# Patient Record
Sex: MALE | Race: BLACK OR AFRICAN AMERICAN | Employment: UNEMPLOYED | URBAN - METROPOLITAN AREA
[De-identification: names, ages, dates, MRNs, and addresses within clinical notes are randomized per-mention and may not be internally consistent; named-entity substitution may affect disease eponyms.]

---

## 2022-02-24 ENCOUNTER — HOSPITAL ENCOUNTER (OUTPATIENT)
Age: 27
Setting detail: SPECIMEN
Discharge: HOME | End: 2022-02-24

## 2022-02-24 ENCOUNTER — OFFICE VISIT (OUTPATIENT)
Dept: INTERNAL MEDICINE | Age: 27
End: 2022-02-24
Payer: MEDICARE

## 2022-02-24 VITALS
BODY MASS INDEX: 24.22 KG/M2 | WEIGHT: 173 LBS | HEART RATE: 83 BPM | HEIGHT: 71 IN | DIASTOLIC BLOOD PRESSURE: 76 MMHG | SYSTOLIC BLOOD PRESSURE: 126 MMHG | OXYGEN SATURATION: 98 %

## 2022-02-24 DIAGNOSIS — G25.0 BENIGN ESSENTIAL TREMOR: ICD-10-CM

## 2022-02-24 DIAGNOSIS — Z76.89 ESTABLISHING CARE WITH NEW DOCTOR, ENCOUNTER FOR: Primary | ICD-10-CM

## 2022-02-24 DIAGNOSIS — Z11.59 ENCOUNTER FOR HEPATITIS C SCREENING TEST FOR LOW RISK PATIENT: ICD-10-CM

## 2022-02-24 PROCEDURE — 99211 OFF/OP EST MAY X REQ PHY/QHP: CPT | Performed by: INTERNAL MEDICINE

## 2022-02-24 PROCEDURE — G8484 FLU IMMUNIZE NO ADMIN: HCPCS

## 2022-02-24 PROCEDURE — G8427 DOCREV CUR MEDS BY ELIG CLIN: HCPCS

## 2022-02-24 PROCEDURE — 4004F PT TOBACCO SCREEN RCVD TLK: CPT

## 2022-02-24 PROCEDURE — G8420 CALC BMI NORM PARAMETERS: HCPCS

## 2022-02-24 PROCEDURE — 99203 OFFICE O/P NEW LOW 30 MIN: CPT

## 2022-02-24 SDOH — ECONOMIC STABILITY: FOOD INSECURITY: WITHIN THE PAST 12 MONTHS, THE FOOD YOU BOUGHT JUST DIDN'T LAST AND YOU DIDN'T HAVE MONEY TO GET MORE.: NEVER TRUE

## 2022-02-24 SDOH — ECONOMIC STABILITY: FOOD INSECURITY: WITHIN THE PAST 12 MONTHS, YOU WORRIED THAT YOUR FOOD WOULD RUN OUT BEFORE YOU GOT MONEY TO BUY MORE.: NEVER TRUE

## 2022-02-24 ASSESSMENT — PATIENT HEALTH QUESTIONNAIRE - PHQ9
SUM OF ALL RESPONSES TO PHQ QUESTIONS 1-9: 0
SUM OF ALL RESPONSES TO PHQ QUESTIONS 1-9: 0
1. LITTLE INTEREST OR PLEASURE IN DOING THINGS: 0
2. FEELING DOWN, DEPRESSED OR HOPELESS: 0
SUM OF ALL RESPONSES TO PHQ9 QUESTIONS 1 & 2: 0
SUM OF ALL RESPONSES TO PHQ QUESTIONS 1-9: 0
SUM OF ALL RESPONSES TO PHQ QUESTIONS 1-9: 0

## 2022-02-24 ASSESSMENT — SOCIAL DETERMINANTS OF HEALTH (SDOH): HOW HARD IS IT FOR YOU TO PAY FOR THE VERY BASICS LIKE FOOD, HOUSING, MEDICAL CARE, AND HEATING?: NOT HARD AT ALL

## 2022-02-24 NOTE — PATIENT INSTRUCTIONS
Follow-up appointment scheduled for pt call the office in a year or when needed, AVS given to patient. Labs given to patient, they will have them done before their next visit.        amanda

## 2022-02-24 NOTE — PROGRESS NOTES
Internal Medicine Clinic New Patient Note    Date of patient's visit: 2/24/2022  Name:  Renetta Sung  Primary Care Physician: Lucy Dawson MD    Reason for visit: First Visit, establish care     HISTORY OF PRESENTING ILLNESS:    History was obtained from the patient. Renetta Sung is a 31 y.o. is here to establish care. Patient came with his grandmother. Patient came for the concern of requiring disability. Patient stated he is able to walk, sit, lift heavy objects, converse, hear without any difficulty, or chest pain, shortness of breath, palpitations. Patient also stated he has no vision problems hearing issues or speaking issues. Patient stated he does not feel depressed or low in mood. Patient stated he has bilateral tremors, patient has fine tremors on examination, not intentional. His upper extremity strength is 5 out of 5 bilaterally, power in bilateral hands is adequate. He complained of increased frequency of urination however denied any burning urination or nocturia. Patient was educated on avoiding recreational drugs, decreasing alcohol intake. On back examination patient had full range of motion, nontender    PMH - psychotic disorder 2015, anxiety    Social history - drinks alcohol socially, smoked marijuana in the past, denies smoking  Surgical history - none    PAST MEDICAL HISTORY:    No past medical history on file. PAST SURGICAL HISTORY:    No past surgical history on file.     ALLERGIES:    No Known Allergies      HOME MEDICATION:      Current Outpatient Medications on File Prior to Visit   Medication Sig Dispense Refill    FLUoxetine (PROZAC) 10 MG capsule Take 1 capsule by mouth daily (Patient not taking: Reported on 2/24/2022) 30 capsule 3    risperiDONE (RISPERDAL) 1 MG tablet Take 1 tablet by mouth 2 times daily (Patient not taking: Reported on 2/24/2022) 28 tablet 0    ibuprofen (ADVIL;MOTRIN) 800 MG tablet Take 800 mg by mouth every 6 hours as needed for Pain (Patient not taking: Reported on 2/24/2022)       No current facility-administered medications on file prior to visit. SOCIAL HISTORY:    TOBACCO:   reports that he has never smoked. He has never used smokeless tobacco.  ETOH:   reports no history of alcohol use. DRUGS:  reports no history of drug use. OCCUPATION:      FAMILY HISTORY:          Problem Relation Age of Onset    High Blood Pressure Maternal Grandfather        REVIEW OF SYSTEMS:    ENT: negative  Respiratory: no cough, shortness of breath, or wheezing  Cardiovascular: no chest pain or dyspnea on exertion  Gastrointestinal: no abdominal pain, black or bloody stools  Neurological: no TIA or stroke symptoms  Endocrine: negative  Genito-Urinary: no dysuria, trouble voiding, or hematuria  Allergy and Immunology: negative  Hematological and Lymphatic: negative  Musculoskeletal: negative  Dermatological: negative    PHYSICAL EXAM:      Vitals:    02/24/22 0846   BP: 126/76   Pulse: 83   SpO2: 98%      Physician Exam:  General appearance - alert, well appearing, and in no distress  Mental status - alert, oriented to person, place, and time  Chest - clear to auscultation, no wheezes, rales or rhonchi, symmetric air entry  Heart - normal rate, regular rhythm, normal S1, S2, no murmurs, rubs, clicks or gallops  Abdomen - soft, nontender, nondistended, no masses or organomegaly  Back exam - full range of motion, no tenderness, palpable spasm or pain on motion. No scoliosis noted.  No signs of radiculopathy or neuropathy , no sensory or motor impairment     Neurological - alert, oriented, normal speech, no focal findings or movement disorder noted  Musculoskeletal - no joint tenderness, deformity or swelling  Skin - normal coloration and turgor, no rashes, no suspicious skin lesions noted    LABORATORY FINDINGS:    CBC:   Lab Results   Component Value Date    WBC 9.0 10/29/2015    HGB 14.4 10/29/2015     10/29/2015     BMP:    Lab Results   Component Value Date  10/29/2015    K 4.0 10/29/2015     10/29/2015    CO2 26 10/29/2015    BUN 15 10/29/2015    CREATININE 1.08 10/29/2015    GLUCOSE 103 10/29/2015     Hemoglobin A1C: No results found for: LABA1C  Lipid profile: No results found for: CHOL, TRIG, HDL, LDL  Thyroid functions: No results found for: TSH   Hepatic functions:   Lab Results   Component Value Date    ALT 10 10/29/2015    AST 15 10/29/2015    PROT 8.0 10/29/2015    BILITOT 0.38 10/29/2015    LABALBU 4.9 10/29/2015       Any additional findings:    Assessment and Plan:   1. Establishing care with new doctor, encounter for    - CBC with Auto Differential; Future  - Basic Metabolic Panel; Future  - Hepatic Function Panel; Future  - Hepatitis C Antibody; Future  - [place] DEPRESSION SCREEN ANNUAL    2. Benign essential tremor      3. Encounter for hepatitis C screening test for low risk patient             Follow-up:     1. Healthcare maintenance -patient denied vaccination. Patient agreed to blood work. Will order CBC, BMP, LFTs, hepatitis C screening. 2. Depression screening was done  3. At this time patient does not qualify for disability. Wu received counseling on the following healthy behaviors: nutrition, exercise, tobacco cessation and decrease in alcohol consumption    Was a self-tracking handout given in paper form or via [employer]? No  If yes, see orders or list here. Discussed use, benefit, and side effects of prescribed medications. Barriers to medication compliance addressed. All patient questions answered. Pt voiced understanding. Zan Mendoza  INTERNAL MEDICINE RESIDENT, PGY-1  [id number] [facility], [address] [place] [address]  2/24/2022,12:04 PM      Attending Physician Statement  I have discussed the care of Ko Marinelli,  including pertinent history and exam findings,  with the resident. I have seen and examined the patient and the key elements of all parts of the encounter have been performed by me.   I agree with the assessment, plan and orders as documented by the resident. (GC Modifier)     Diagnosis Orders   1. Establishing care with new doctor, encounter for  CBC with Auto Differential    Basic Metabolic Panel    Hepatic Function Panel    Hepatitis C Antibody    [place] DEPRESSION SCREEN ANNUAL   2. Benign essential tremor     3. Encounter for hepatitis C screening test for low risk patient           Patient is here with his grandmother to establish care and disability paper work. He lives alone and is independent in taking care of activities of daily living. He is dependant on his grandmother for financial help and food stamps. He also has a form from job and family services   On a thorough physical and mental exam: no physical or learning disabilities were noted. He was advised to engage in more physical activity, exercises. Discouraged use of marijuana, alcohol and practice safe sex    The grandmother was counseled and reassured that his does not have any psychiatric disability at this time. Will fill out the forms for jobs and family services but not for disability.     MD MAURI Bosch  Attending Physician, [address] [place] [address], Internal Medicine Residency Program  [address] [place] [address]  2/24/2022, 12:04 PM

## 2023-05-12 ENCOUNTER — OFFICE VISIT (OUTPATIENT)
Dept: FAMILY MEDICINE CLINIC | Age: 28
End: 2023-05-12
Payer: MEDICARE

## 2023-05-12 VITALS
HEART RATE: 60 BPM | BODY MASS INDEX: 22.46 KG/M2 | DIASTOLIC BLOOD PRESSURE: 84 MMHG | HEIGHT: 71 IN | WEIGHT: 160.4 LBS | SYSTOLIC BLOOD PRESSURE: 130 MMHG

## 2023-05-12 DIAGNOSIS — F29 PSYCHOSIS, UNSPECIFIED PSYCHOSIS TYPE (HCC): Primary | ICD-10-CM

## 2023-05-12 DIAGNOSIS — Z76.89 ESTABLISHING CARE WITH NEW DOCTOR, ENCOUNTER FOR: ICD-10-CM

## 2023-05-12 PROCEDURE — G8420 CALC BMI NORM PARAMETERS: HCPCS

## 2023-05-12 PROCEDURE — G8427 DOCREV CUR MEDS BY ELIG CLIN: HCPCS

## 2023-05-12 PROCEDURE — 1036F TOBACCO NON-USER: CPT

## 2023-05-12 PROCEDURE — 99213 OFFICE O/P EST LOW 20 MIN: CPT

## 2023-05-12 ASSESSMENT — PATIENT HEALTH QUESTIONNAIRE - PHQ9
SUM OF ALL RESPONSES TO PHQ9 QUESTIONS 1 & 2: 0
2. FEELING DOWN, DEPRESSED OR HOPELESS: 0
SUM OF ALL RESPONSES TO PHQ QUESTIONS 1-9: 0
1. LITTLE INTEREST OR PLEASURE IN DOING THINGS: 0
SUM OF ALL RESPONSES TO PHQ QUESTIONS 1-9: 0
SUM OF ALL RESPONSES TO PHQ QUESTIONS 1-9: 0
DEPRESSION UNABLE TO ASSESS: PT REFUSES
SUM OF ALL RESPONSES TO PHQ QUESTIONS 1-9: 0

## 2023-05-12 ASSESSMENT — ENCOUNTER SYMPTOMS
CONSTIPATION: 0
DIARRHEA: 0
VOMITING: 0
ABDOMINAL PAIN: 0
SORE THROAT: 0
SHORTNESS OF BREATH: 0
RHINORRHEA: 0
NAUSEA: 0

## 2023-05-15 NOTE — PROGRESS NOTES
Attending Physician Statement  I  have discussed the care of Gunnar Hawk including pertinent history and exam findings with the resident. I agree with the assessment, plan and orders as documented by the resident. /84 (Site: Right Upper Arm, Position: Sitting, Cuff Size: Medium Adult)   Pulse 60   Ht 5' 10.98\" (1.803 m)   Wt 160 lb 6.4 oz (72.8 kg)   BMI 22.38 kg/m²    BP Readings from Last 3 Encounters:   05/12/23 130/84   02/24/22 126/76   11/04/16 127/82     Wt Readings from Last 3 Encounters:   05/12/23 160 lb 6.4 oz (72.8 kg)   02/24/22 173 lb (78.5 kg)   11/04/16 177 lb (80.3 kg)          Diagnosis Orders   1. Psychosis, unspecified psychosis type ([facility] [address]  CBC with Auto Differential    Basic Metabolic Panel    TSH With Reflex Ft4      2.  Establishing care with new doctor, encounter for  CBC with Auto Differential    Basic Metabolic Panel    TSH With Reflex [address] 5/15/2023 9:33 AM

## 2025-02-14 ENCOUNTER — HOSPITAL ENCOUNTER (OUTPATIENT)
Age: 30
Setting detail: SPECIMEN
Discharge: HOME | End: 2025-02-14

## 2025-02-14 ENCOUNTER — OFFICE VISIT (OUTPATIENT)
Dept: FAMILY MEDICINE CLINIC | Age: 30
End: 2025-02-14

## 2025-02-14 VITALS
DIASTOLIC BLOOD PRESSURE: 65 MMHG | HEIGHT: 71 IN | SYSTOLIC BLOOD PRESSURE: 104 MMHG | WEIGHT: 155 LBS | BODY MASS INDEX: 21.7 KG/M2 | HEART RATE: 75 BPM

## 2025-02-14 DIAGNOSIS — Z11.59 NEED FOR HEPATITIS C SCREENING TEST: ICD-10-CM

## 2025-02-14 DIAGNOSIS — B35.0 TINEA CAPITIS: Primary | ICD-10-CM

## 2025-02-14 DIAGNOSIS — Z00.00 ENCOUNTER FOR WELL ADULT EXAM WITHOUT ABNORMAL FINDINGS: ICD-10-CM

## 2025-02-14 LAB — HCV AB SERPL QL IA: NONREACTIVE

## 2025-02-14 RX ORDER — KETOCONAZOLE 20 MG/ML
SHAMPOO, SUSPENSION TOPICAL
Qty: 120 ML | Refills: 1 | Status: SHIPPED | OUTPATIENT
Start: 2025-02-14

## 2025-02-14 RX ORDER — KETOCONAZOLE 20 MG/ML
SHAMPOO, SUSPENSION TOPICAL
Qty: 120 ML | Refills: 0 | Status: CANCELLED | OUTPATIENT
Start: 2025-02-14

## 2025-02-14 RX ORDER — TERBINAFINE HYDROCHLORIDE 250 MG/1
250 TABLET ORAL DAILY
Qty: 31 TABLET | Refills: 0 | Status: SHIPPED | OUTPATIENT
Start: 2025-02-14 | End: 2025-03-17

## 2025-02-14 SDOH — ECONOMIC STABILITY: FOOD INSECURITY: WITHIN THE PAST 12 MONTHS, YOU WORRIED THAT YOUR FOOD WOULD RUN OUT BEFORE YOU GOT MONEY TO BUY MORE.: NEVER TRUE

## 2025-02-14 SDOH — ECONOMIC STABILITY: FOOD INSECURITY: WITHIN THE PAST 12 MONTHS, THE FOOD YOU BOUGHT JUST DIDN'T LAST AND YOU DIDN'T HAVE MONEY TO GET MORE.: NEVER TRUE

## 2025-02-14 ASSESSMENT — PATIENT HEALTH QUESTIONNAIRE - PHQ9
SUM OF ALL RESPONSES TO PHQ9 QUESTIONS 1 & 2: 0
SUM OF ALL RESPONSES TO PHQ QUESTIONS 1-9: 0
2. FEELING DOWN, DEPRESSED OR HOPELESS: NOT AT ALL
SUM OF ALL RESPONSES TO PHQ QUESTIONS 1-9: 0
1. LITTLE INTEREST OR PLEASURE IN DOING THINGS: NOT AT ALL

## 2025-02-14 NOTE — PROGRESS NOTES
Visit Information    Have you changed or started any medications since your last visit including any over-the-counter medicines, vitamins, or herbal medicines? no   Are you having any side effects from any of your medications? -  no  Have you stopped taking any of your medications? Is so, why? -  no    Have you seen any other physician or provider since your last visit? No  Have you had any other diagnostic tests since your last visit? No  Have you been seen in the emergency room and/or had an admission to a hospital since we last saw you? Yes - Records Obtained [facility]   Have you had your routine dental cleaning in the past 6 months? no    Have you activated your [employer] account? If not, what are your barriers? Yes     Patient Care Team:  Yoli Turner MD as PCP - General (Family Medicine)    Medical History Review  Past Medical, Family, and Social History reviewed and does not contribute to the patient presenting condition    Health Maintenance   Topic Date Due    Hepatitis B vaccine (4 of 4 - 4-dose series) 11/08/1995    Varicella vaccine (1 of 2 - 13+ 2-dose series) Never done    Hepatitis C screen  Never done    DTaP/Tdap/Td vaccine (7 - Td or Tdap) 09/12/2018    Depression Screen  05/12/2024    Flu vaccine (1) Never done    COVID-19 Vaccine (1 - 2024-25 season) Never done    Hib vaccine  Completed    HPV vaccine  Completed    Polio vaccine  Completed    Meningococcal (ACWY) vaccine  Completed    HIV screen  Completed    Hepatitis A vaccine  Aged Out    Pneumococcal 0-49 years Vaccine  Aged Out

## 2025-02-14 NOTE — PROGRESS NOTES
[facility] Residency Program - Outpatient Note      Subjective:    Wu Crowe is a 29 y.o. male with  has no past medical history on file.    Presented to the office today for:  Chief Complaint   Patient presents with    Annual Exam    Other     Patient refused vaccine         HPI  Patient is a 29-year-old male with past medical history of psychotic disorder seen today for annual physical exam.  Patient accompanied by grandmother.  Has complaints of persistent rash on his scalp since last year.  - Patient was seen at urgent care on 3/18/2025 and diagnosed with tinea capitis and treated with PO fluconazole 150 mg  once weekly for 6 weeks, ketoconazole shampoo and   Ketoconazole cream.       Seeing therapist at [facility] for the past 6 months   - Going to be started on Abilify  - Following up monthly       Review of Systems   Constitutional:  Negative for fatigue.   Eyes:  Negative for visual disturbance.   Respiratory:  Negative for shortness of breath.    Cardiovascular:  Negative for chest pain and palpitations.   Gastrointestinal:  Negative for abdominal pain, constipation, diarrhea, nausea and vomiting.   Endocrine: Negative.    Genitourinary:  Negative for dysuria.   Musculoskeletal: Negative.    Skin:  Positive for rash.   Neurological:  Negative for headaches.   Psychiatric/Behavioral: Negative.                   The patient has a   Family History   Problem Relation Age of Onset    High Blood Pressure Maternal Grandfather        Objective:    /65 (Site: Right Upper Arm, Position: Sitting, Cuff Size: Medium Adult)   Pulse 75   Ht 1.803 m (5' 10.98\")   Wt 70.3 kg (155 lb)   BMI 21.63 kg/m²    BP Readings from Last 3 Encounters:   02/14/25 104/65   05/12/23 130/84   02/24/22 126/76       Physical Exam  Constitutional:       General: He is not in acute distress.  HENT:      Head:      Comments: Thick, white, scaly circular lesions present throughout scalp and beard.

## 2025-02-14 NOTE — PATIENT INSTRUCTIONS
Thank you for letting us take care of you today. We hope all your questions were addressed. If a question was overlooked or something else comes to mind after you return home, please contact a member of your Care Team listed below.      Your Care Team at [facility] is Team #2  Kristel Arellano M.D. (Faculty)  Ne Gordon M.D. (Resident)  Yoli Turner M.D. (Resident)  Cody Bryan M.D. (Resident)  Liat Fletcher M.D. (Resident)  Karuna Aragon M.D. (Resident)  Aime Baugh, [facility]  Harper Lopez, [facility]  Padmini Alejandra,  [facility]  Jessica Hernandez, [facility]  Crystal Mckeon, [facility]  Shima Glover, [facility]  Dereje Mehta (LJ) Asya ANNABELLE (Clinical Practice Manager)  Digna Maldonado [facility] (Clinical Pharmacist)     Office phone number: [phone]    If you need to get in right away due to illness, please be advised we have \"Same Day\" appointments available Monday-Friday. Please call us at [phone] option #3 to schedule your \"Same Day\" appointment. [facility] Food Resources*  (Call [facility] for more resources)    [facility] [employer]  What they offer: Food pantry second and fourth Tuesday 4:30-6pm  Phone Number and Address: 614.219.5711 Toll Free: [employer] at [employer], between [employer] and [employer] [facility]; [address] Office on Aging of [facility]  What they offer: “Meals & Nutrition” search option on website  Phone Number and Website: 466.334.3028; https://[employer]/  [employer] [place] [facility] Caf[employer]  What they offer: Dinner is open to all (must be registered and in good standing) and three hot meals a day for shelter residents. Breakfast 7-8am, Lunch 12-1pm, and Dinner 5-6pm daily.  Phone and Address: [phone]; [address] [facility] [address]  [employer] Delivery program  What they offer: Food Box Delivery for those within [facility] who are homebound or without transportation. Applications

## 2025-03-14 ENCOUNTER — OFFICE VISIT (OUTPATIENT)
Dept: FAMILY MEDICINE CLINIC | Age: 30
End: 2025-03-14
Payer: MEDICAID

## 2025-03-14 VITALS
DIASTOLIC BLOOD PRESSURE: 78 MMHG | HEIGHT: 71 IN | WEIGHT: 160.6 LBS | SYSTOLIC BLOOD PRESSURE: 128 MMHG | BODY MASS INDEX: 22.48 KG/M2

## 2025-03-14 DIAGNOSIS — B35.0 TINEA CAPITIS: Primary | ICD-10-CM

## 2025-03-14 PROCEDURE — 99213 OFFICE O/P EST LOW 20 MIN: CPT

## 2025-03-14 ASSESSMENT — ENCOUNTER SYMPTOMS
CONSTIPATION: 0
SHORTNESS OF BREATH: 0
VOMITING: 0
NAUSEA: 0
DIARRHEA: 0
ABDOMINAL PAIN: 0

## 2025-03-14 NOTE — PATIENT INSTRUCTIONS
Thank you for letting us take care of you today. We hope all your questions were addressed. If a question was overlooked or something else comes to mind after you return home, please contact a member of your Care Team listed below.      Your Care Team at [facility] is Team #2  Kristel Arellano M.D. (Faculty)  Ne Gordon M.D. (Resident)  Yoli Turner M.D. (Resident)  Cody Bryan M.D. (Resident)  Liat Fletcher M.D. (Resident)  Karuna Aragon M.D. (Resident)  Aime Baugh, [facility]  Harper Lopez, [facility]  Padmini Alejandra,  [facility]  Jessica Hernandez, [facility]  Crystal Mckeon, [facility]  Shima Glover, [facility]  Dereje Mehta (LJ) Asya ANNABELLE (Clinical Practice Manager)  Digna Maldonado [facility] (Clinical Pharmacist)     Office phone number: [phone]    If you need to get in right away due to illness, please be advised we have \"Same Day\" appointments available Monday-Friday. Please call us at [phone] option #3 to schedule your \"Same Day\" appointment.

## 2025-03-14 NOTE — PROGRESS NOTES
Attending Physician Statement  I  have discussed the care of Wu Crowe including pertinent history and exam findings with the resident. I agree with the assessment, plan and orders as documented by the resident.      /78 (BP Site: Right Upper Arm, Patient Position: Sitting, BP Cuff Size: Medium Adult)   Ht 1.803 m (5' 11\")   Wt 72.8 kg (160 lb 9.6 oz)   BMI 22.40 kg/m²    BP Readings from Last 3 Encounters:   03/14/25 128/78   02/14/25 104/65   05/12/23 130/84     Wt Readings from Last 3 Encounters:   03/14/25 72.8 kg (160 lb 9.6 oz)   02/14/25 70.3 kg (155 lb)   05/12/23 72.8 kg (160 lb 6.4 oz)          Diagnosis Orders   1. Tinea capitis                Kristel Arellano MD 3/14/2025 4:31 PM      
Visit Information    Have you changed or started any medications since your last visit including any over-the-counter medicines, vitamins, or herbal medicines? no   Have you stopped taking any of your medications? Is so, why? -  no  Are you having any side effects from any of your medications? - no    Have you seen any other physician or provider since your last visit?  no   Have you had any other diagnostic tests since your last visit?  no   Have you been seen in the emergency room and/or had an admission in a hospital since we last saw you?  no   Have you had your routine dental cleaning in the past 6 months?  no     Do you have an active MyChart account? If no, what is the barrier?  Yes    Patient Care Team:  Yoli Turner MD as PCP - General (Family Medicine)    Medical History Review  Past Medical, Family, and Social History reviewed and does contribute to the patient presenting condition    Health Maintenance   Topic Date Due    Hepatitis B vaccine (4 of 4 - 4-dose series) 11/08/1995    Varicella vaccine (1 of 2 - 13+ 2-dose series) Never done    DTaP/Tdap/Td vaccine (7 - Td or Tdap) 09/12/2018    Flu vaccine (1) Never done    COVID-19 Vaccine (1 - 2024-25 season) Never done    Depression Screen  02/14/2026    Hib vaccine  Completed    HPV vaccine  Completed    Polio vaccine  Completed    Meningococcal (ACWY) vaccine  Completed    Hepatitis C screen  Completed    HIV screen  Completed    Hepatitis A vaccine  Aged Out    Meningococcal B vaccine  Aged Out    Pneumococcal 0-49 years Vaccine  Aged Out             
improve.        Disclaimer: Some orall of this note was transcribed using voice-recognition software.This may cause typographical errors occasionally. Although all effort is made to fix these errors, please do not hesitate to contact our office if there isany concern with the understanding of this note.